# Patient Record
Sex: MALE | Race: WHITE
[De-identification: names, ages, dates, MRNs, and addresses within clinical notes are randomized per-mention and may not be internally consistent; named-entity substitution may affect disease eponyms.]

---

## 2020-08-11 ENCOUNTER — HOSPITAL ENCOUNTER (INPATIENT)
Dept: HOSPITAL 93 - ER | Age: 25
LOS: 15 days | Discharge: HOME | DRG: 330 | End: 2020-08-26
Attending: COLON & RECTAL SURGERY | Admitting: COLON & RECTAL SURGERY
Payer: COMMERCIAL

## 2020-08-11 VITALS — HEIGHT: 64 IN | BODY MASS INDEX: 29.02 KG/M2 | WEIGHT: 170 LBS

## 2020-08-11 DIAGNOSIS — D62: ICD-10-CM

## 2020-08-11 DIAGNOSIS — K36: ICD-10-CM

## 2020-08-11 DIAGNOSIS — J45.20: ICD-10-CM

## 2020-08-11 DIAGNOSIS — R11.2: ICD-10-CM

## 2020-08-11 DIAGNOSIS — Z03.818: ICD-10-CM

## 2020-08-11 DIAGNOSIS — K56.690: ICD-10-CM

## 2020-08-11 DIAGNOSIS — K50.013: Primary | ICD-10-CM

## 2020-08-11 DIAGNOSIS — K92.1: ICD-10-CM

## 2020-08-11 DIAGNOSIS — K66.0: ICD-10-CM

## 2020-08-11 PROCEDURE — 8E0ZXY6 ISOLATION: ICD-10-PCS | Performed by: COLON & RECTAL SURGERY

## 2020-08-11 NOTE — NUR
PT ALERTA Y ORINETADA X3 ESFERAS SE LE ORIENTA SOBRE TX Y REFIERE ENTEDER. SE
KATELIN MUESTRAS DE KOBY Y VENOPUNCION CON TECNCIAS ASEPTICAS. SE ADMINISTRAN
IVLFUIDS ORDENADOS. PT TOLERA TX, TRANQUILO Y SIN DIFICULTAD RESPIRATORIA. SE
MANTIENE BAJO OBSERVACION POR CAMBIOS EN TONY.

## 2020-08-11 NOTE — NUR
SE RECIBE PACIENTE DE 27 YRS, EN AMBULANCIA, [facility]
[id number], CON DOLOR ABDOMINAL, HX. CHRONS, MEMBRENO. DR.NICOLAS COHEN. SE UBICA
EN OTTO # 11, CON BARRANDAS ELEVADAS Y NIVEL MAS BAJO.

## 2020-08-12 PROCEDURE — 8E0ZXY6 ISOLATION: ICD-10-PCS | Performed by: INTERNAL MEDICINE

## 2020-08-13 PROCEDURE — 05HY33Z INSERTION OF INFUSION DEVICE INTO UPPER VEIN, PERCUTANEOUS APPROACH: ICD-10-PCS | Performed by: INTERNAL MEDICINE

## 2020-08-13 PROCEDURE — 3E0336Z INTRODUCTION OF NUTRITIONAL SUBSTANCE INTO PERIPHERAL VEIN, PERCUTANEOUS APPROACH: ICD-10-PCS | Performed by: INTERNAL MEDICINE

## 2020-08-14 PROCEDURE — BW21Y0Z COMPUTERIZED TOMOGRAPHY (CT SCAN) OF ABDOMEN AND PELVIS USING OTHER CONTRAST, UNENHANCED AND ENHANCED: ICD-10-PCS | Performed by: COLON & RECTAL SURGERY

## 2020-08-18 PROCEDURE — 0DTH4ZZ RESECTION OF CECUM, PERCUTANEOUS ENDOSCOPIC APPROACH: ICD-10-PCS | Performed by: COLON & RECTAL SURGERY

## 2020-08-18 PROCEDURE — 0DTJ4ZZ RESECTION OF APPENDIX, PERCUTANEOUS ENDOSCOPIC APPROACH: ICD-10-PCS | Performed by: COLON & RECTAL SURGERY

## 2020-08-20 PROCEDURE — 30243N1 TRANSFUSION OF NONAUTOLOGOUS RED BLOOD CELLS INTO CENTRAL VEIN, PERCUTANEOUS APPROACH: ICD-10-PCS | Performed by: COLON & RECTAL SURGERY

## 2020-08-21 PROCEDURE — 3E0G76Z INTRODUCTION OF NUTRITIONAL SUBSTANCE INTO UPPER GI, VIA NATURAL OR ARTIFICIAL OPENING: ICD-10-PCS | Performed by: COLON & RECTAL SURGERY

## 2020-08-21 PROCEDURE — 0DH67UZ INSERTION OF FEEDING DEVICE INTO STOMACH, VIA NATURAL OR ARTIFICIAL OPENING: ICD-10-PCS | Performed by: COLON & RECTAL SURGERY

## 2020-09-01 ENCOUNTER — HOSPITAL ENCOUNTER (INPATIENT)
Dept: HOSPITAL 93 - ER | Age: 25
LOS: 21 days | Discharge: HOME | DRG: 862 | End: 2020-09-22
Attending: INTERNAL MEDICINE | Admitting: INTERNAL MEDICINE
Payer: COMMERCIAL

## 2020-09-01 VITALS — BODY MASS INDEX: 29.02 KG/M2 | WEIGHT: 170 LBS | HEIGHT: 64 IN

## 2020-09-01 DIAGNOSIS — K65.1: ICD-10-CM

## 2020-09-01 DIAGNOSIS — T81.43XA: Primary | ICD-10-CM

## 2020-09-01 DIAGNOSIS — E27.49: ICD-10-CM

## 2020-09-01 DIAGNOSIS — Z20.828: ICD-10-CM

## 2020-09-01 DIAGNOSIS — E86.0: ICD-10-CM

## 2020-09-01 DIAGNOSIS — B96.89: ICD-10-CM

## 2020-09-01 DIAGNOSIS — Z16.39: ICD-10-CM

## 2020-09-01 DIAGNOSIS — A41.9: ICD-10-CM

## 2020-09-01 DIAGNOSIS — B96.1: ICD-10-CM

## 2020-09-01 DIAGNOSIS — I87.2: ICD-10-CM
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
## 2020-09-01 DIAGNOSIS — D64.9: ICD-10-CM

## 2020-09-01 DIAGNOSIS — K91.89: ICD-10-CM

## 2020-09-01 DIAGNOSIS — Z79.52: ICD-10-CM

## 2020-09-02 PROCEDURE — 0W9G30Z DRAINAGE OF PERITONEAL CAVITY WITH DRAINAGE DEVICE, PERCUTANEOUS APPROACH: ICD-10-PCS | Performed by: RADIOLOGY

## 2020-09-03 PROCEDURE — 02H633Z INSERTION OF INFUSION DEVICE INTO RIGHT ATRIUM, PERCUTANEOUS APPROACH: ICD-10-PCS | Performed by: INTERNAL MEDICINE

## 2020-09-04 PROCEDURE — 4A12X4Z MONITORING OF CARDIAC ELECTRICAL ACTIVITY, EXTERNAL APPROACH: ICD-10-PCS | Performed by: INTERNAL MEDICINE

## 2020-09-09 PROCEDURE — BW20YZZ COMPUTERIZED TOMOGRAPHY (CT SCAN) OF ABDOMEN USING OTHER CONTRAST: ICD-10-PCS | Performed by: RADIOLOGY

## 2020-09-10 PROCEDURE — 0W9G30Z DRAINAGE OF PERITONEAL CAVITY WITH DRAINAGE DEVICE, PERCUTANEOUS APPROACH: ICD-10-PCS | Performed by: RADIOLOGY

## 2020-09-12 PROCEDURE — 8E0ZXY6 ISOLATION: ICD-10-PCS | Performed by: INTERNAL MEDICINE

## 2020-09-17 PROCEDURE — BW20YZZ COMPUTERIZED TOMOGRAPHY (CT SCAN) OF ABDOMEN USING OTHER CONTRAST: ICD-10-PCS | Performed by: RADIOLOGY

## 2020-10-09 ENCOUNTER — HOSPITAL ENCOUNTER (INPATIENT)
Dept: HOSPITAL 93 - ER | Age: 25
LOS: 14 days | Discharge: HOME | DRG: 862 | End: 2020-10-23
Attending: INTERNAL MEDICINE | Admitting: INTERNAL MEDICINE
Payer: COMMERCIAL

## 2020-10-09 VITALS — WEIGHT: 155 LBS | HEIGHT: 64 IN | BODY MASS INDEX: 26.46 KG/M2

## 2020-10-09 DIAGNOSIS — T81.43XA: Primary | ICD-10-CM

## 2020-10-09 DIAGNOSIS — J45.20: ICD-10-CM

## 2020-10-09 DIAGNOSIS — K65.1: ICD-10-CM

## 2020-10-09 DIAGNOSIS — K50.90: ICD-10-CM

## 2020-10-09 DIAGNOSIS — Z79.52: ICD-10-CM

## 2020-10-09 DIAGNOSIS — Z20.828: ICD-10-CM

## 2020-10-09 PROCEDURE — 8E0ZXY6 ISOLATION: ICD-10-PCS | Performed by: INTERNAL MEDICINE

## 2020-10-09 NOTE — NUR
PACIENTE ALERTA Y ORIENTADO EN TIEMPO LUGAR Y PERSONA. RN PEREZ ORIETA A
PACIENTE SOBRE TRATAMIENTO, EL MISMO VERBALIZA ENTENDER. SE COLOCA VENOPUNCION
EN BRAZO R+ PATENTE, LISSETH DE S/S DE INFILTRACION, SE CONECTA 0.9 NSS A
150ML/HR. SE COLECTAN MUESTRAS ORDENADAS Y PTE EN ESPERA DE RESULTADOS PARA
RE-EVALUACION MEDICA.

## 2020-10-09 NOTE — NUR
PTE REFIER QUE TIENE UN TAINA DOLOR ABDOMINAL EN EL CUADRANTE DERECHO SE UBICA
PTE EN OTTO PTE DE REGAN COHEN

## 2020-10-10 PROCEDURE — 02H633Z INSERTION OF INFUSION DEVICE INTO RIGHT ATRIUM, PERCUTANEOUS APPROACH: ICD-10-PCS | Performed by: INTERNAL MEDICINE

## 2020-10-13 PROCEDURE — BW21YZZ COMPUTERIZED TOMOGRAPHY (CT SCAN) OF ABDOMEN AND PELVIS USING OTHER CONTRAST: ICD-10-PCS | Performed by: RADIOLOGY

## 2020-10-20 PROCEDURE — BW21YZZ COMPUTERIZED TOMOGRAPHY (CT SCAN) OF ABDOMEN AND PELVIS USING OTHER CONTRAST: ICD-10-PCS | Performed by: RADIOLOGY
